# Patient Record
Sex: FEMALE | Race: WHITE | Employment: FULL TIME | URBAN - METROPOLITAN AREA
[De-identification: names, ages, dates, MRNs, and addresses within clinical notes are randomized per-mention and may not be internally consistent; named-entity substitution may affect disease eponyms.]

---

## 2017-04-03 ENCOUNTER — OFFICE VISIT (OUTPATIENT)
Dept: FAMILY MEDICINE | Facility: CLINIC | Age: 57
End: 2017-04-03
Payer: COMMERCIAL

## 2017-04-03 VITALS
HEART RATE: 86 BPM | SYSTOLIC BLOOD PRESSURE: 133 MMHG | DIASTOLIC BLOOD PRESSURE: 84 MMHG | HEIGHT: 65 IN | OXYGEN SATURATION: 97 % | WEIGHT: 210.44 LBS | BODY MASS INDEX: 35.06 KG/M2 | TEMPERATURE: 98.2 F

## 2017-04-03 DIAGNOSIS — J01.90 ACUTE SINUSITIS WITH SYMPTOMS > 10 DAYS: ICD-10-CM

## 2017-04-03 DIAGNOSIS — B02.9 HERPES ZOSTER WITHOUT COMPLICATION: Primary | ICD-10-CM

## 2017-04-03 PROCEDURE — 99214 OFFICE O/P EST MOD 30 MIN: CPT | Performed by: PHYSICIAN ASSISTANT

## 2017-04-03 RX ORDER — HYDROCODONE BITARTRATE AND ACETAMINOPHEN 5; 325 MG/1; MG/1
1 TABLET ORAL EVERY 6 HOURS PRN
Qty: 30 TABLET | Refills: 0 | Status: SHIPPED | OUTPATIENT
Start: 2017-04-03

## 2017-04-03 RX ORDER — VALACYCLOVIR HYDROCHLORIDE 1 G/1
1000 TABLET, FILM COATED ORAL 3 TIMES DAILY
Qty: 21 TABLET | Refills: 0 | Status: SHIPPED | OUTPATIENT
Start: 2017-04-03 | End: 2017-04-10

## 2017-04-03 NOTE — NURSING NOTE
"Chief Complaint   Patient presents with     Derm Problem       Initial /84  Pulse 86  Temp 98.2  F (36.8  C) (Oral)  Ht 5' 4.75\" (1.645 m)  Wt 210 lb 7 oz (95.5 kg)  SpO2 97%  Breastfeeding? No  BMI 35.29 kg/m2 Estimated body mass index is 35.29 kg/(m^2) as calculated from the following:    Height as of this encounter: 5' 4.75\" (1.645 m).    Weight as of this encounter: 210 lb 7 oz (95.5 kg).  Medication Reconciliation: complete       Panel management: patient is due for mammo , pap and colonoscopy, patient does not remember what year she had them done or where. Patient goes to [facility] for primary care     Livia Ocampo CMA      "

## 2017-04-03 NOTE — MR AVS SNAPSHOT
After Visit Summary   4/3/2017    Dang Stratton    MRN: [id number]           Patient Information     Date Of Birth          1960        Visit Information        Provider Department      4/3/2017 3:00 PM Dona Mathur PA-C [facility]        Today's Diagnoses     Herpes zoster without complication    -  1    Acute sinusitis with symptoms > 10 days          Care Instructions    Augmentin 875 mg, 1 tablet twice a day for 10 days  Increase fluids  Nasal wash  Mucinex DM  Follow up if not better after the antibiotic course.       Valtrex 1 gram three times a day for 7 days  Hydrocodone 1 tablet every 4-6 hours as needed for pain  Ibuprofen 600 mg every 6 hours for pain as needed   Shingles (Herpes Zoster)  Shingles is also called herpes zoster. It is a painful skin rash caused by the herpes zoster virus. This is the same virus that causes chickenpox. After a person has chickenpox, the virus remains inactive in the nerve cells. Years later, the virus can become active again and travel to the skin. Most people have shingles only once, but it is possible to have it more than once.  What are the risk factors for shingles?  Anyone who has had chickenpox can develop shingles. But your risk is greater if you:    Are 50 years of age or older.    Have an illness that weakens your immune system, such as HIV/AIDS.    Have cancer, especially Hodgkin disease or lymphoma.    Take medications that weaken your immune system.  What are the symptoms of shingles?     The shingles rash usually appears on just one side of the body.     The first sign of shingles is usually pain, burning, tingling, or itching on one part of your face or body. You may also feel as if you have the flu, with fever and chills.    A red rash with small blisters appears within a few days. The rash may appear as follows:      The blisters can occur anywhere, but they re most common on the back, chest, or  abdomen.    They usually appear on only one side of the body, spreading along the nerve pathway where the virus was inactive.     The rash can also form around an eye, along one side of the face or neck, or in the mouth.    In a few people, usually those with weakened immune systems, shingles appear on more than one part of the body at once.    After a few days, the blisters become dry and form a crust. The crust falls off in days to weeks. The blisters generally do not leave scars.  How is shingles treated?  For most people, shingles heals on its own in a few weeks. But treatment is recommended to help relieve pain, speed healing, and reduce the risk of complications. Antiviral medications are prescribed within the first 72 hours of the appearance of the rash. To lessen symptoms:    Apply ice packs (wrapped in a thin towel), cool compresses,  or soak in a cool bath.    Use calamine lotion to calm itchy skin.    Ask your health care provider about over-the-counter pain relievers. If your pain is severe, your provider may prescribe stronger pain medications.  What are the complications of shingles?  Shingles often goes away with no lasting effects. But some people have serious problems long after the blisters have healed:    Postherpetic neuralgia. This is severe nerve pain that lasts for months, or even years after you have shingles. Medications can be prescribed to help relieve the pain and improve quality of life.    Bacterial infection. Shingles blisters may become infected with bacteria. Antibiotic medication is used to treat the infection.    Eye problems. A person with shingles on the face should see his or her health care provider right away. Shingles can cause serious problems with vision, and even blindness.  When to seek medical care  Contact your health care provider if you experience any of the following:    Symptoms that don t go away with treatment.    A rash or blisters near your eye.    Increased  drainage, fever, or rash after treatment, or severe pain that doesn t go away.   How can shingles be prevented?  You can only get shingles if you have had chicken pox in the past. Those who have never had chickenpox can get the virus from you. Although instead of developing shingles, the person may get chickenpox. Until your blisters form scabs, avoid contact with others, especially the following:    Pregnant women who have never had chickenpox or the vaccine    Infants who were born early (prematurely) or who had low weight at birth    People with weak immune system (for example, people receiving chemotherapy for cancer, people who have had organ transplants, or people with HIV infections)     The shingles vaccine  If you re 60 years of age or older , ask your health care provider if you should receive the shingles vaccine. The vaccine makes it less likely that you will develop shingles. If you do develop shingles, your symptoms will likely be milder than if you hadn t been vaccinated. Note: Although the vaccine is licensed for people 50 years of age or older, the CDC does not recommend the vaccine for those who are 50 to 59 years old.     3024-5920 [employer]. [address] [place] [address]. All rights reserved. This information is not intended as a substitute for professional medical care. Always follow your healthcare professional's instructions.              Follow-ups after your visit        Who to contact     If you have questions or need follow up information about today's clinic visit or your schedule please contact [facility] directly at [phone].  Normal or non-critical lab and imaging results will be communicated to you by MyChart, letter or phone within 4 business days after the clinic has received the results. If you do not hear from us within 7 days, please contact the clinic through MyChart or phone. If you have a critical or abnormal lab result, we  "will notify you by phone as soon as possible.  Submit refill requests through [employer] or call your pharmacy and they will forward the refill request to us. Please allow 3 business days for your refill to be completed.          Additional Information About Your Visit        [employer] Information     [employer] lets you send messages to your doctor, view your test results, renew your prescriptions, schedule appointments and more. To sign up, go to [email or web address] . Click on \"Log in\" on the left side of the screen, which will take you to the Welcome page. Then click on \"Sign up Now\" on the right side of the page.     You will be asked to enter the access code listed below, as well as some personal information. Please follow the directions to create your username and password.     Your access code is: [email or web address]  Expires: 2017  3:31 PM     Your access code will  in 90 days. If you need help or a new code, please call your [place] clinic or [phone].        Care EveryWhere ID     This is your Care EveryWhere ID. This could be used by other organizations to access your [place] medical records  [id number]        Your Vitals Were     Pulse Temperature Height Pulse Oximetry Breastfeeding? BMI (Body Mass Index)    86 98.2  F (36.8  C) (Oral) 5' 4.75\" (1.645 m) 97% No 35.29 kg/m2       Blood Pressure from Last 3 Encounters:   17 133/84   04/06/15 136/84   13 112/73    Weight from Last 3 Encounters:   17 210 lb 7 oz (95.5 kg)   04/06/15 200 lb (90.7 kg)   13 178 lb 4 oz (80.9 kg)              Today, you had the following     No orders found for display         Today's Medication Changes          These changes are accurate as of: 4/3/17  3:31 PM.  If you have any questions, ask your nurse or doctor.               Start taking these medicines.        Dose/Directions    amoxicillin-clavulanate 875-125 MG per tablet   Commonly known as:  AUGMENTIN   Used for:  Acute sinusitis with " symptoms > 10 days   Started by:  Dona Mathur PA-C        Dose:  1 tablet   Take 1 tablet by mouth 2 times daily for 10 days   Quantity:  20 tablet   Refills:  0       HYDROcodone-acetaminophen 5-325 MG per tablet   Commonly known as:  NORCO   Used for:  Herpes zoster without complication   Started by:  Dona Mathur PA-C        Dose:  1 tablet   Take 1 tablet by mouth every 6 hours as needed   Quantity:  30 tablet   Refills:  0       valACYclovir 1000 mg tablet   Commonly known as:  VALTREX   Used for:  Herpes zoster without complication   Started by:  Dona Mathur PA-C        Dose:  1000 mg   Take 1 tablet (1,000 mg) by mouth 3 times daily for 7 days   Quantity:  21 tablet   Refills:  0            Where to get your medicines      These medications [place] [facility] [place] - [address]  [address], [facility] [address]     Phone:  [phone]     amoxicillin-clavulanate 875-125 MG per tablet    valACYclovir 1000 mg tablet         Some of these will need a paper prescription and others can be bought over the counter.  Ask your nurse if you have questions.     Bring a paper prescription for each of these medications     HYDROcodone-acetaminophen 5-325 MG per tablet                Primary Care Provider    No Pcp Confirmed       No address on file        Thank you!     Thank you for choosing [facility]  for your care. Our goal is always to provide you with excellent care. Hearing back from our patients is one way we can continue to improve our services. Please take a few minutes to complete the written survey that you may receive in the mail after your visit with us. Thank you!             Your Updated Medication List - Protect others around you: Learn how to safely use, store and throw away your medicines at www.disposemymeds.org.          This list is accurate as of: 4/3/17  3:31 PM.   Always use your most recent med list.                   Brand Name Dispense Instructions for use    amoxicillin-clavulanate 875-125 MG per tablet    AUGMENTIN    20 tablet    Take 1 tablet by mouth 2 times daily for 10 days       HYDROcodone-acetaminophen 5-325 MG per tablet    NORCO    30 tablet    Take 1 tablet by mouth every 6 hours as needed       ibuprofen 800 MG tablet    ADVIL/MOTRIN    30 tablet    Take 1 tablet (800 mg) by mouth every 8 hours as needed for moderate pain       order for DME     1 Device    Equipment being ordered: boot       valACYclovir 1000 mg tablet    VALTREX    21 tablet    Take 1 tablet (1,000 mg) by mouth 3 times daily for 7 days

## 2017-04-03 NOTE — PROGRESS NOTES
SUBJECTIVE:                                                    Dang Stratton is a 56 year old female who presents to clinic today for the following health issues:      Rash     Onset: 1 week     Description:   Location: moving around arms mainly on right shoulder and on right side of upper chest   Character: blotchy, red, round   Itching (Pruritis): no     Progression of Symptoms:  worsening    Accompanying Signs & Symptoms:  Fever: no   Body aches or joint pain: YES- neck and back stiffness/ tightness   Pain shoots up back of neck   Sore throat symptoms: YES- today   Recent cold symptoms: YES- 1 week ago    History:   Previous similar rash: no     Precipitating factors:   Exposure to similar rash: no   New exposures: medication for the pain of neck and skin    Recent travel: no     Alleviating factors:  Nothing      Therapies Tried and outcome: heat rub , aleve          Problem list and histories reviewed & adjusted, as indicated.  Additional history: as documented    Patient Active Problem List   Diagnosis     CARDIOVASCULAR SCREENING; LDL GOAL LESS THAN 160     Past Surgical History:   Procedure Laterality Date     TUBAL LIGATION         Social History   Substance Use Topics     Smoking status: Never Smoker     Smokeless tobacco: Never Used     Alcohol use 0.5 - 1.0 oz/week     1 - 2 drink(s) per week     Family History   Problem Relation Age of Onset     Hypertension Mother      Hypertension Father          Current Outpatient Prescriptions   Medication Sig Dispense Refill     valACYclovir (VALTREX) 1000 mg tablet Take 1 tablet (1,000 mg) by mouth 3 times daily for 7 days 21 tablet 0     amoxicillin-clavulanate (AUGMENTIN) 875-125 MG per tablet Take 1 tablet by mouth 2 times daily for 10 days 20 tablet 0     HYDROcodone-acetaminophen (NORCO) 5-325 MG per tablet Take 1 tablet by mouth every 6 hours as needed 30 tablet 0     ORDER FOR DME Equipment being ordered: boot 1 Device 0     ibuprofen (ADVIL,MOTRIN) 800 MG  "tablet Take 1 tablet (800 mg) by mouth every 8 hours as needed for moderate pain 30 tablet 1     Allergies   Allergen Reactions     No Known Drug Allergies        Reviewed and updated as needed this visit by clinical staff       Reviewed and updated as needed this visit by Provider         ROS:  Constitutional, HEENT, cardiovascular, pulmonary, gi and gu systems are negative, except as otherwise noted.    OBJECTIVE:                                                    /84  Pulse 86  Temp 98.2  F (36.8  C) (Oral)  Ht 5' 4.75\" (1.645 m)  Wt 210 lb 7 oz (95.5 kg)  SpO2 97%  Breastfeeding? No  BMI 35.29 kg/m2  Body mass index is 35.29 kg/(m^2).  GENERAL: healthy, alert and no distress  EYES: Eyes grossly normal to inspection, PERRL and conjunctivae and sclerae normal  HENT: normal cephalic/atraumatic, both ears: clear effusion, nasal mucosa edematous , oral mucous membranes moist and sinuses: maxillary tenderness on bilaterally  NECK: no adenopathy, no asymmetry, masses, or scars and thyroid normal to palpation  RESP: lungs clear to auscultation - no rales, rhonchi or wheezes  CV: regular rate and rhythm, normal S1 S2, no S3 or S4, no murmur, click or rub, no peripheral edema and peripheral pulses strong  ABDOMEN: soft, nontender, no hepatosplenomegaly, no masses and bowel sounds normal  MS: no gross musculoskeletal defects noted, no edema  SKIN:   vesicular lesions on erythematous base in patches in T-1 dermatomal distribution on right arm and upper back    Diagnostic Test Results:  none      ASSESSMENT/PLAN:                                                        ICD-10-CM    1. Herpes zoster without complication B02.9 valACYclovir (VALTREX) 1000 mg tablet     HYDROcodone-acetaminophen (NORCO) 5-325 MG per tablet   2. Acute sinusitis with symptoms > 10 days J01.90 amoxicillin-clavulanate (AUGMENTIN) 875-125 MG per tablet   1. Valtrex 1 gram three times a day for 7 days  Hydrocodone 1 tablet every 4-6 hours " as needed for pain  Ibuprofen 600 mg every 6 hours for pain as needed     2.Augmentin 875 mg, 1 tablet twice a day for 10 days  Increase fluids  Nasal wash  Mucinex DM  Follow up if not better after the antibiotic course.           Dona Mathur PA-C  [facility]

## 2017-04-03 NOTE — PATIENT INSTRUCTIONS
Augmentin 875 mg, 1 tablet twice a day for 10 days  Increase fluids  Nasal wash  Mucinex DM  Follow up if not better after the antibiotic course.       Valtrex 1 gram three times a day for 7 days  Hydrocodone 1 tablet every 4-6 hours as needed for pain  Ibuprofen 600 mg every 6 hours for pain as needed   Shingles (Herpes Zoster)  Shingles is also called herpes zoster. It is a painful skin rash caused by the herpes zoster virus. This is the same virus that causes chickenpox. After a person has chickenpox, the virus remains inactive in the nerve cells. Years later, the virus can become active again and travel to the skin. Most people have shingles only once, but it is possible to have it more than once.  What are the risk factors for shingles?  Anyone who has had chickenpox can develop shingles. But your risk is greater if you:    Are 50 years of age or older.    Have an illness that weakens your immune system, such as HIV/AIDS.    Have cancer, especially Hodgkin disease or lymphoma.    Take medications that weaken your immune system.  What are the symptoms of shingles?     The shingles rash usually appears on just one side of the body.     The first sign of shingles is usually pain, burning, tingling, or itching on one part of your face or body. You may also feel as if you have the flu, with fever and chills.    A red rash with small blisters appears within a few days. The rash may appear as follows:      The blisters can occur anywhere, but they re most common on the back, chest, or abdomen.    They usually appear on only one side of the body, spreading along the nerve pathway where the virus was inactive.     The rash can also form around an eye, along one side of the face or neck, or in the mouth.    In a few people, usually those with weakened immune systems, shingles appear on more than one part of the body at once.    After a few days, the blisters become dry and form a crust. The crust falls off in days to  weeks. The blisters generally do not leave scars.  How is shingles treated?  For most people, shingles heals on its own in a few weeks. But treatment is recommended to help relieve pain, speed healing, and reduce the risk of complications. Antiviral medications are prescribed within the first 72 hours of the appearance of the rash. To lessen symptoms:    Apply ice packs (wrapped in a thin towel), cool compresses,  or soak in a cool bath.    Use calamine lotion to calm itchy skin.    Ask your health care provider about over-the-counter pain relievers. If your pain is severe, your provider may prescribe stronger pain medications.  What are the complications of shingles?  Shingles often goes away with no lasting effects. But some people have serious problems long after the blisters have healed:    Postherpetic neuralgia. This is severe nerve pain that lasts for months, or even years after you have shingles. Medications can be prescribed to help relieve the pain and improve quality of life.    Bacterial infection. Shingles blisters may become infected with bacteria. Antibiotic medication is used to treat the infection.    Eye problems. A person with shingles on the face should see his or her health care provider right away. Shingles can cause serious problems with vision, and even blindness.  When to seek medical care  Contact your health care provider if you experience any of the following:    Symptoms that don t go away with treatment.    A rash or blisters near your eye.    Increased drainage, fever, or rash after treatment, or severe pain that doesn t go away.   How can shingles be prevented?  You can only get shingles if you have had chicken pox in the past. Those who have never had chickenpox can get the virus from you. Although instead of developing shingles, the person may get chickenpox. Until your blisters form scabs, avoid contact with others, especially the following:    Pregnant women who have never had  chickenpox or the vaccine    Infants who were born early (prematurely) or who had low weight at birth    People with weak immune system (for example, people receiving chemotherapy for cancer, people who have had organ transplants, or people with HIV infections)     The shingles vaccine  If you re 60 years of age or older , ask your health care provider if you should receive the shingles vaccine. The vaccine makes it less likely that you will develop shingles. If you do develop shingles, your symptoms will likely be milder than if you hadn t been vaccinated. Note: Although the vaccine is licensed for people 50 years of age or older, the CDC does not recommend the vaccine for those who are 50 to 59 years old.     5321-2641 [employer]. [address] [place] [address], [place] [address]. All rights reserved. This information is not intended as a substitute for professional medical care. Always follow your healthcare professional's instructions.

## 2017-04-11 PROBLEM — E66.812 OBESITY, CLASS II, BMI 35-39.9: Status: ACTIVE | Noted: 2017-04-11

## 2024-03-22 ENCOUNTER — OFFICE VISIT (OUTPATIENT)
Dept: URGENT CARE | Facility: URGENT CARE | Age: 64
End: 2024-03-22
Payer: COMMERCIAL

## 2024-03-22 VITALS
RESPIRATION RATE: 16 BRPM | SYSTOLIC BLOOD PRESSURE: 119 MMHG | TEMPERATURE: 98 F | WEIGHT: 215 LBS | OXYGEN SATURATION: 98 % | BODY MASS INDEX: 36.05 KG/M2 | HEART RATE: 85 BPM | DIASTOLIC BLOOD PRESSURE: 81 MMHG

## 2024-03-22 DIAGNOSIS — R39.15 URINARY URGENCY: ICD-10-CM

## 2024-03-22 DIAGNOSIS — R21 RASH AND NONSPECIFIC SKIN ERUPTION: Primary | ICD-10-CM

## 2024-03-22 LAB
ALBUMIN UR-MCNC: NEGATIVE MG/DL
APPEARANCE UR: CLEAR
BILIRUB UR QL STRIP: NEGATIVE
COLOR UR AUTO: YELLOW
GLUCOSE UR STRIP-MCNC: NEGATIVE MG/DL
HGB UR QL STRIP: ABNORMAL
KETONES UR STRIP-MCNC: NEGATIVE MG/DL
LEUKOCYTE ESTERASE UR QL STRIP: NEGATIVE
NITRATE UR QL: NEGATIVE
PH UR STRIP: 7 [PH] (ref 5–7)
RBC #/AREA URNS AUTO: ABNORMAL /HPF
SP GR UR STRIP: 1.01 (ref 1–1.03)
SQUAMOUS #/AREA URNS AUTO: ABNORMAL /LPF
TRANS CELLS #/AREA URNS HPF: ABNORMAL /HPF
UROBILINOGEN UR STRIP-ACNC: 1 E.U./DL
WBC #/AREA URNS AUTO: ABNORMAL /HPF

## 2024-03-22 PROCEDURE — 81001 URINALYSIS AUTO W/SCOPE: CPT | Performed by: PHYSICIAN ASSISTANT

## 2024-03-22 PROCEDURE — 87086 URINE CULTURE/COLONY COUNT: CPT | Performed by: PHYSICIAN ASSISTANT

## 2024-03-22 PROCEDURE — 99203 OFFICE O/P NEW LOW 30 MIN: CPT | Performed by: PHYSICIAN ASSISTANT

## 2024-03-22 RX ORDER — PREDNISONE 20 MG/1
40 TABLET ORAL DAILY
Qty: 14 TABLET | Refills: 0 | Status: SHIPPED | OUTPATIENT
Start: 2024-03-22 | End: 2024-03-29

## 2024-03-22 RX ORDER — CETIRIZINE HYDROCHLORIDE 10 MG/1
10 TABLET ORAL DAILY
Qty: 30 TABLET | Refills: 0 | Status: SHIPPED | OUTPATIENT
Start: 2024-03-22

## 2024-03-22 ASSESSMENT — ENCOUNTER SYMPTOMS
COLOR CHANGE: 1
HEMATURIA: 0
FLANK PAIN: 0
NAUSEA: 0
PALPITATIONS: 0
CHILLS: 0
CARDIOVASCULAR NEGATIVE: 1
ABDOMINAL PAIN: 0
BLOOD IN STOOL: 0
DYSURIA: 0
VOMITING: 0
DIARRHEA: 0
CONSTIPATION: 0
FEVER: 0
FREQUENCY: 1
FATIGUE: 0
WOUND: 0

## 2024-03-22 ASSESSMENT — PAIN SCALES - GENERAL: PAINLEVEL: NO PAIN (0)

## 2024-03-22 NOTE — PROGRESS NOTES
Lexie Leong is a 63 year old, presenting for the following health issues:  Derm Problem (Patient reports rash on top of feet, lower legs, on both arms, and now starting on chest. Rash is itchy. Rash began Sunday. Patient has taken Benadryl which has not helped. Calamine lotion helps, per patient. ) and Rule out Urinary Tract Infection (Patient reports dull low back pain, urinary frequency, urgency. Patient denies vaginal symptoms. Patient also reports some generalized fatigue.)    HPI   Rash  Onset/Duration: 5days  Description  Location: arms, legs, chest and back  Character: raised, red, hives like  Itching: moderate  Intensity:  moderate  Progression of Symptoms:  worsening  Accompanying signs and symptoms:   Fever: No  Body aches or joint pain: No  Sore throat symptoms: No  Recent cold symptoms: No  History:           Previous episodes of similar rash: None  New exposures:  new body soap  Recent travel: No  Exposure to similar rash: No  Precipitating or alleviating factors: none  Therapies tried and outcome: Benadryl/diphenhydramine -  not effective    Genitourinary - Female  Onset/Duration: 2days  Description:   Painful urination (Dysuria): No           Frequency: YES  Blood in urine (Hematuria): No  Delay in urine (Hesitency): YES  Intensity: mild  Progression of Symptoms:  same  Accompanying Signs & Symptoms:  Fever/chills: No  Flank pain: No but reports low back pain  Nausea and vomiting: YES, fatigue  Vaginal symptoms: none  Abdominal/Pelvic Pain: No  History:   History of frequent UTI s: No  History of kidney stones: No  Sexually Active: No  Possibility of pregnancy: No  Precipitating or alleviating factors: None  Therapies tried and outcome: Increase fluid intake with minimal relief    Patient Active Problem List   Diagnosis    CARDIOVASCULAR SCREENING; LDL GOAL LESS THAN 160    Obesity, Class II, BMI 35-39.9     Current Outpatient Medications   Medication    HYDROcodone-acetaminophen (NORCO)  5-325 MG per tablet    ibuprofen (ADVIL,MOTRIN) 800 MG tablet    ORDER FOR DME    valACYclovir (VALTREX) 1000 mg tablet     No current facility-administered medications for this visit.      No Known Allergies    Review of Systems   Constitutional:  Negative for chills, fatigue and fever.   Cardiovascular: Negative.  Negative for chest pain, palpitations and leg swelling.   Gastrointestinal:  Negative for abdominal pain, blood in stool, constipation, diarrhea, nausea and vomiting.   Genitourinary:  Positive for frequency and urgency. Negative for dysuria, flank pain, hematuria, pelvic pain, vaginal bleeding and vaginal discharge.   Skin:  Positive for color change and rash. Negative for pallor and wound.   All other systems reviewed and are negative.          Objective    /81 (BP Location: Left arm, Patient Position: Sitting, Cuff Size: Adult Large)   Pulse 85   Temp 98  F (36.7  C) (Tympanic)   Resp 16   Wt 97.5 kg (215 lb)   SpO2 98%   BMI 36.05 kg/m    Body mass index is 36.05 kg/m .  Physical Exam  Vitals and nursing note reviewed.   Constitutional:       General: She is not in acute distress.     Appearance: Normal appearance. She is well-developed and normal weight. She is not ill-appearing.   Cardiovascular:      Rate and Rhythm: Normal rate and regular rhythm.      Pulses: Normal pulses.      Heart sounds: Normal heart sounds, S1 normal and S2 normal. No murmur heard.     No friction rub. No gallop.   Pulmonary:      Effort: Pulmonary effort is normal. No accessory muscle usage or respiratory distress.      Breath sounds: Normal breath sounds and air entry. No decreased breath sounds, wheezing, rhonchi or rales.   Abdominal:      General: Abdomen is flat. Bowel sounds are normal.      Palpations: Abdomen is soft. There is no hepatomegaly, splenomegaly or mass.      Tenderness: There is no abdominal tenderness. There is no right CVA tenderness, left CVA tenderness, guarding or rebound. Negative  signs include Hogan's sign, Rovsing's sign, McBurney's sign, psoas sign and obturator sign.      Hernia: No hernia is present.   Genitourinary:     Comments: Declined pelvic exam.  Skin:     General: Skin is warm and dry.      Capillary Refill: Capillary refill takes less than 2 seconds.      Findings: Rash present. No abrasion, abscess, ecchymosis, erythema, lesion, petechiae or wound. Rash is papular and urticarial. Rash is not crusting, macular, nodular, purpuric, pustular, scaling or vesicular.   Neurological:      Mental Status: She is alert and oriented to person, place, and time.   Psychiatric:         Mood and Affect: Mood normal.         Behavior: Behavior normal.         Thought Content: Thought content normal.         Judgment: Judgment normal.       Results for orders placed or performed in visit on 03/22/24 (from the past 24 hour(s))   UA Macroscopic with reflex to Microscopic and Culture - Clinic Collect    Specimen: Urine, Clean Catch   Result Value Ref Range    Color Urine Yellow Colorless, Straw, Light Yellow, Yellow    Appearance Urine Clear Clear    Glucose Urine Negative Negative mg/dL    Bilirubin Urine Negative Negative    Ketones Urine Negative Negative mg/dL    Specific Gravity Urine 1.015 1.003 - 1.035    Blood Urine Trace (A) Negative    pH Urine 7.0 5.0 - 7.0    Protein Albumin Urine Negative Negative mg/dL    Urobilinogen Urine 1.0 0.2, 1.0 E.U./dL    Nitrite Urine Negative Negative    Leukocyte Esterase Urine Negative Negative   UA Microscopic with Reflex to Culture   Result Value Ref Range    RBC Urine 0-2 0-2 /HPF /HPF    WBC Urine 0-5 0-5 /HPF /HPF    Squamous Epithelials Urine Few (A) None Seen /LPF    Transitional Epithelials Urine Few (A) None Seen /HPF    Narrative    Urine Culture not indicated           Assessment/Plan:  Rash and nonspecific skin eruption:  ?contact/allergic dermatitis vs hives.  Will give [id number] and recommended zyrtec for itching.  Avoid triggers and  irritating agents.  Avoid scratching to prevent secondary infection.  RTC if worsening rash or if she develops redness, swelling, drainage or fevers.  Will send to DERM if no improvement.   -     Adult Dermatology  Referral; Future  -     predniSONE (DELTASONE) 20 MG tablet; Take 2 tablets (40 mg) by mouth daily for 7 days  -     cetirizine (ZYRTEC) 10 MG tablet; Take 1 tablet (10 mg) by mouth daily    Urinary urgency:  UA/micro was unremarkable, will send for UC.  Recommend increase fluids, regular voids, proper wiping techniques and voiding after intercourse.  Educated patient on warning signs of kidney infection and to go to the ER if she develops any of these symptoms.  Recheck in clinic if symptoms worsen or if symptoms do not improve.  -     UA Macroscopic with reflex to Microscopic and Culture - Clinic Collect  -     UA Microscopic with Reflex to Culture  -     Urine Culture Aerobic Bacterial - lab collect; Future        Ynes See JORDY Bell

## 2024-03-23 LAB — BACTERIA UR CULT: NORMAL

## 2024-04-21 ENCOUNTER — HEALTH MAINTENANCE LETTER (OUTPATIENT)
Age: 64
End: 2024-04-21

## 2025-01-13 ENCOUNTER — OFFICE VISIT (OUTPATIENT)
Dept: URGENT CARE | Facility: URGENT CARE | Age: 65
End: 2025-01-13
Payer: COMMERCIAL

## 2025-01-13 ENCOUNTER — ANCILLARY PROCEDURE (OUTPATIENT)
Dept: GENERAL RADIOLOGY | Facility: CLINIC | Age: 65
End: 2025-01-13
Payer: COMMERCIAL

## 2025-01-13 VITALS
HEART RATE: 88 BPM | SYSTOLIC BLOOD PRESSURE: 133 MMHG | OXYGEN SATURATION: 95 % | TEMPERATURE: 98 F | WEIGHT: 223 LBS | BODY MASS INDEX: 37.4 KG/M2 | RESPIRATION RATE: 18 BRPM | DIASTOLIC BLOOD PRESSURE: 85 MMHG

## 2025-01-13 DIAGNOSIS — R05.1 ACUTE COUGH: ICD-10-CM

## 2025-01-13 DIAGNOSIS — J20.9 ACUTE BRONCHITIS, UNSPECIFIED ORGANISM: Primary | ICD-10-CM

## 2025-01-13 PROCEDURE — 99213 OFFICE O/P EST LOW 20 MIN: CPT

## 2025-01-13 PROCEDURE — 71046 X-RAY EXAM CHEST 2 VIEWS: CPT | Mod: TC | Performed by: RADIOLOGY

## 2025-01-13 RX ORDER — BENZONATATE 200 MG/1
200 CAPSULE ORAL 3 TIMES DAILY PRN
Qty: 21 CAPSULE | Refills: 0 | Status: SHIPPED | OUTPATIENT
Start: 2025-01-13

## 2025-01-13 RX ORDER — PREDNISONE 20 MG/1
20 TABLET ORAL DAILY
Qty: 5 TABLET | Refills: 0 | Status: SHIPPED | OUTPATIENT
Start: 2025-01-13 | End: 2025-01-18

## 2025-01-13 ASSESSMENT — PAIN SCALES - GENERAL: PAINLEVEL_OUTOF10: MODERATE PAIN (5)

## 2025-01-13 NOTE — PROGRESS NOTES
ASSESSMENT:  (J20.9) Acute bronchitis, unspecified organism  (primary encounter diagnosis)  Plan: benzonatate (TESSALON) 200 MG capsule,         predniSONE (DELTASONE) 20 MG tablet    (R05.1) Acute cough  Plan: XR Chest 2 Views    PLAN:  Informed the patient that the chest x-ray does not show any pneumonia per the radiologist report.  Bronchitis patient instructions discussed and provided.  Informed the patient to take prednisone and benzonatate as prescribed and follow-up with her primary care provider should symptoms persist.  Patient acknowledged her understanding of the above plan.    The use of Dragon/PowerMic dictation services may have been used to construct the content in this note; any grammatical or spelling errors are non-intentional. Please contact the author of this note directly if you are in need of any clarification.      SHAYNA Cruz CNP      SUBJECTIVE:  Dang Stratton is a 64 year old female who presents to the clinic today with a chief complaint of cough  for over two weeks.  Her cough is described as slightly productive.    The patient's symptoms are moderate to severe and not changing over the course of time.  Associated symptoms include nasal congestion and rhinorrhea. The patient's symptoms are exacerbated by rolling over.  Patient has been using OTC cough suppressants  to improve symptoms.    ROS  Negative except noted above.     OBJECTIVE:  /85 (BP Location: Left arm, Patient Position: Sitting, Cuff Size: Adult Regular)   Pulse 88   Temp 98  F (36.7  C) (Oral)   Resp 18   Wt 101.2 kg (223 lb)   SpO2 95%   BMI 37.40 kg/m    GENERAL APPEARANCE: healthy, alert and no distress  EYES: EOMI,  PERRL, conjunctiva clear  HENT: ear canals and TM's normal.  Nose and mouth without ulcers, erythema or lesions  NECK: supple, nontender, no lymphadenopathy  RESP: lungs clear to auscultation - no rales, rhonchi or wheezes  CV: regular rates and rhythm, normal S1 S2, no murmur  noted  SKIN: no suspicious lesions or rashes    X-RAY: Chest x-ray shows the following per the radiologist report: Linear scarring in the lingula. Right lung is clear. Heart and pulmonary vascularity are normal. No signs of acute disease.

## 2025-05-11 ENCOUNTER — HEALTH MAINTENANCE LETTER (OUTPATIENT)
Age: 65
End: 2025-05-11